# Patient Record
Sex: FEMALE | Race: WHITE | NOT HISPANIC OR LATINO | Employment: UNEMPLOYED | URBAN - METROPOLITAN AREA
[De-identification: names, ages, dates, MRNs, and addresses within clinical notes are randomized per-mention and may not be internally consistent; named-entity substitution may affect disease eponyms.]

---

## 2020-09-18 ENCOUNTER — OFFICE VISIT (OUTPATIENT)
Dept: FAMILY MEDICINE CLINIC | Facility: CLINIC | Age: 30
End: 2020-09-18

## 2020-09-18 VITALS
SYSTOLIC BLOOD PRESSURE: 128 MMHG | DIASTOLIC BLOOD PRESSURE: 78 MMHG | HEIGHT: 63 IN | WEIGHT: 181 LBS | OXYGEN SATURATION: 98 % | BODY MASS INDEX: 32.07 KG/M2 | HEART RATE: 87 BPM

## 2020-09-18 DIAGNOSIS — F41.9 ANXIETY: Primary | ICD-10-CM

## 2020-09-18 DIAGNOSIS — F32.9 MAJOR DEPRESSIVE DISORDER WITH CURRENT ACTIVE EPISODE, UNSPECIFIED DEPRESSION EPISODE SEVERITY, UNSPECIFIED WHETHER RECURRENT: ICD-10-CM

## 2020-09-18 DIAGNOSIS — F51.04 PSYCHOPHYSIOLOGICAL INSOMNIA: ICD-10-CM

## 2020-09-18 PROCEDURE — 99204 OFFICE O/P NEW MOD 45 MIN: CPT | Performed by: INTERNAL MEDICINE

## 2020-09-18 RX ORDER — ESCITALOPRAM OXALATE 20 MG/1
20 TABLET ORAL DAILY
Qty: 30 TABLET | Refills: 5 | Status: SHIPPED | OUTPATIENT
Start: 2020-09-18 | End: 2021-06-21 | Stop reason: SDUPTHER

## 2020-09-18 RX ORDER — CITALOPRAM 40 MG/1
40 TABLET ORAL DAILY
COMMUNITY
Start: 2020-06-17 | End: 2020-09-18

## 2020-09-18 RX ORDER — TRAZODONE HYDROCHLORIDE 50 MG/1
50 TABLET ORAL NIGHTLY
Qty: 30 TABLET | Refills: 3 | Status: SHIPPED | OUTPATIENT
Start: 2020-09-18 | End: 2021-06-21

## 2020-09-18 NOTE — PROGRESS NOTES
Sonia Banks  1990  [id number]  Patient Care Team:  Andres Streeter MD as PCP - General (Internal Medicine)    Sonia Banks is a 30 y.o. female here today to establish care.  This patient is accompanied by their self who contributes to the history of their care.    Chief Complaint:    Chief Complaint   Patient presents with   • Establish Care        History of Present Illness:    celexa 40 since age 20. Anxiety/depression.  She has been out of her medication for a week.  She reports for the past 3 months increase agitation anxiety and depression.  She does have a bladder however owever the baby sleeps through the night.  No homicidal suicidal ideations.  Patient admits to difficulty with sleep onset and early morning awakenings.  She had been weaned off of Celexa before but had a rebound depression that was very severe.  No family history of bipolar however she has a brother that they believe is schizophrenic.  She rarely drinks however smokes approximately a pack per day.    Past Medical History:   Diagnosis Date   • Anxiety    • Depression        History reviewed. No pertinent surgical history.     Family History   Problem Relation Age of Onset   • Anxiety disorder Mother    • Cancer Father         bone   • Other Brother         schizophrenia       Social History     Socioeconomic History   • Marital status: Single     Spouse name: Not on file   • Number of children: Not on file   • Years of education: Not on file   • Highest education level: Not on file   Tobacco Use   • Smoking status: Current Every Day Smoker     Packs/day: 0.25     Years: 10.00     Pack years: 2.50     Types: Cigarettes   • Smokeless tobacco: Never Used   Substance and Sexual Activity   • Alcohol use: Yes   • Drug use: Never   Social History Narrative    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   HENT: Negative for ear pain, postnasal drip, sinus pressure and sore throat.    Eyes: Negative for blurred vision, double vision and visual disturbance.   Respiratory: Negative for cough, shortness of breath and wheezing.    Cardiovascular: Negative for chest pain, palpitations and leg swelling.   Gastrointestinal: Negative for abdominal pain, blood in stool, diarrhea, nausea and vomiting.   Endocrine: Negative for cold intolerance, heat intolerance, polydipsia, polyphagia and polyuria.   Genitourinary: Negative for dysuria, flank pain and hematuria.   Musculoskeletal: Negative for arthralgias and joint swelling.   Skin: Negative for dry skin and rash.   Neurological: Negative for weakness, numbness and headache.   Psychiatric/Behavioral: Positive for sleep disturbance and depressed mood. Negative for self-injury and suicidal ideas. The patient is nervous/anxious.        Vitals:    09/18/20 1607   BP: 128/78   Pulse: 87   SpO2: 98%   Weight: 82.1 kg (181 lb)   Height: 160.4 cm (63.15\")     Body mass index is 31.91 kg/m².      Current Outpatient Medications:   •  escitalopram (Lexapro) 20 MG tablet, Take 1 tablet by mouth Daily., Disp: 30 tablet, Rfl: 5  •  traZODone (DESYREL) 50 MG tablet, Take 1 tablet by mouth Every Night., Disp: 30 tablet, Rfl: 3    Physical Exam:    Physical Exam  Vitals signs and nursing note reviewed.   Constitutional:       General: She is not in acute distress.     Appearance: She is well-developed. She is not diaphoretic.   HENT:      Head: Normocephalic and atraumatic.      Right Ear: Tympanic membrane, ear canal and external ear normal.      Left Ear: Tympanic membrane, ear canal and external ear normal.      Mouth/Throat:      Mouth: Mucous membranes are moist.      Pharynx: No oropharyngeal exudate.   Eyes:      General: No scleral icterus.        Right eye: No discharge.      Conjunctiva/sclera: Conjunctivae normal.   Neck:      Musculoskeletal: Normal range " of motion and neck supple.      Thyroid: No thyromegaly.      Vascular: No JVD.      Trachea: No tracheal deviation.   Cardiovascular:      Rate and Rhythm: Normal rate and regular rhythm.      Pulses: Normal pulses.      Heart sounds: Normal heart sounds.      Comments: PMI nondisplaced  Pulmonary:      Effort: Pulmonary effort is normal.      Breath sounds: Normal breath sounds. No wheezing or rales.   Abdominal:      General: Bowel sounds are normal.      Palpations: Abdomen is soft.      Tenderness: There is no abdominal tenderness. There is no guarding or rebound.   Musculoskeletal:         General: No swelling, tenderness or signs of injury.      Comments: Normal gait     Lymphadenopathy:      Cervical: No cervical adenopathy.   Skin:     General: Skin is warm and dry.      Capillary Refill: Capillary refill takes less than 2 seconds.      Coloration: Skin is not pale.      Findings: No rash.   Neurological:      Mental Status: She is alert and oriented to person, place, and time.      Motor: No abnormal muscle tone.      Coordination: Coordination normal.   Psychiatric:         Mood and Affect: Mood normal.         Behavior: Behavior normal.         Judgment: Judgment normal.         Procedures    Results Review:      Assessment/Plan:    30-year-old female with history of severe depression.  We have resumed her SSRI however change her Lexapro 20 mg daily.  I have also prescribed trazodone 50 mg at bedtime.  She is willing and wants to undergo cognitive behavioral therapy.    Problem List Items Addressed This Visit        Other    Anxiety - Primary    Relevant Orders    Ambulatory Referral to Behavioral Health    Depression    Relevant Medications    escitalopram (Lexapro) 20 MG tablet    traZODone (DESYREL) 50 MG tablet    Psychophysiological insomnia          Plan of care reviewed with patient at the conclusion of today's visit. Education was provided regarding diagnosis and management.  Patient verbalizes  understanding of and agreement with management plan.    Return in about 6 weeks (around 10/30/2020) for Recheck.    Andres Streeter MD    Please note that portions of this note may have been completed with a voice recognition program. Efforts were made to edit the dictations, but occasionally words are mistranscribed.

## 2021-06-14 RX ORDER — ESCITALOPRAM OXALATE 20 MG/1
TABLET ORAL
Qty: 30 TABLET | Refills: 0 | OUTPATIENT
Start: 2021-06-14

## 2021-06-14 NOTE — TELEPHONE ENCOUNTER
LOV:09/18/2020  NOV:Not scheduled  Last fill:09/18/2020    Per PCP's last note patient was suppose to return in about 6 weeks (around 10/30/2020) for anxiety f/u will call to schedule.

## 2021-06-21 ENCOUNTER — TELEMEDICINE (OUTPATIENT)
Dept: FAMILY MEDICINE CLINIC | Facility: CLINIC | Age: 31
End: 2021-06-21

## 2021-06-21 DIAGNOSIS — F33.42 RECURRENT MAJOR DEPRESSIVE DISORDER, IN FULL REMISSION (HCC): ICD-10-CM

## 2021-06-21 DIAGNOSIS — G89.29 CHRONIC MIDLINE THORACIC BACK PAIN: ICD-10-CM

## 2021-06-21 DIAGNOSIS — R05.9 COUGH: Primary | ICD-10-CM

## 2021-06-21 DIAGNOSIS — F41.9 ANXIETY: ICD-10-CM

## 2021-06-21 DIAGNOSIS — M54.6 CHRONIC MIDLINE THORACIC BACK PAIN: ICD-10-CM

## 2021-06-21 PROCEDURE — 99214 OFFICE O/P EST MOD 30 MIN: CPT | Performed by: PHYSICIAN ASSISTANT

## 2021-06-21 RX ORDER — ESCITALOPRAM OXALATE 20 MG/1
20 TABLET ORAL DAILY
Qty: 90 TABLET | Refills: 0 | Status: SHIPPED | OUTPATIENT
Start: 2021-06-21 | End: 2022-08-19 | Stop reason: SDUPTHER

## 2021-06-21 RX ORDER — ESCITALOPRAM OXALATE 20 MG/1
20 TABLET ORAL DAILY
Qty: 30 TABLET | Refills: 5 | Status: SHIPPED | OUTPATIENT
Start: 2021-06-21 | End: 2021-06-21 | Stop reason: SDUPTHER

## 2021-06-21 NOTE — TELEPHONE ENCOUNTER
DELETE AFTER REVIEWING: Send the encounter HIGH priority, If patient has less than a 3 day supply. If the patient will run out of medication over the weekend add that information to the additional details line. Send this encounter to the clinical pool.    Caller: Sonia Banks    Relationship: Self    Best call back number: [phone]    Medication needed:   Requested Prescriptions     Pending Prescriptions Disp Refills   • escitalopram (Lexapro) 20 MG tablet 30 tablet 5     Sig: Take 1 tablet by mouth Daily.       When do you need the refill by: [phone]      What additional details did the patient provide when requesting the medication: PATIENT STATES SHE HAS ONE PILL LEFT; HAS BEEN ON MEDS FOR ABOUT 10 YEARS. STATES IF SHE DOESN'T HAVE HER MEDS, SHE WILL GO INTO A DEPRESSION.  PATIENT ASKING FOR A SMALL REFILL UNTIL SHE CAN COME IN; SAYS HER AND HER DAUGHTER ARE BOTH SICK.  BUT WILL MAKE AN APPT IF NEEDED.         Does the patient have less than a 3 day supply:  [x] Yes  [] No    What is the patient's preferred pharmacy: [id number] - [place], [place] - [address] [facility] [address] - [phone] [facility] [phone] FX       PLEASE CALL PATIENT AND ADVISE.

## 2021-06-21 NOTE — PROGRESS NOTES
Chief Complaint   Patient presents with   • Cough       HPI      Sonia Banks is a 31 y.o. female who presents for Cough, sore throat, myalgias, nasal congestion, post-nasal drainage and loss of taste that started 3 days ago and is not improving.  Her cough is dry, she denies any productive sputum.  No shortness of breath, fever/chills, facial pain, headacheor loss of smell.  Her son has similar symptoms, he has not been seen by pediatrician yet.  Has not been vaccinated for Covid-19.    Patient also inquires about refill on her Lexapro 20 mg daily.  She reports this is helpful for her anxiety.  She denies any issues with depression at this time.  She is not on any other medications and is not followed by psychiatry.    She also reports chronic back pain.    Past Medical History:   Diagnosis Date   • Anxiety    • Depression        History reviewed. No pertinent surgical history.    Family History   Problem Relation Age of Onset   • Anxiety disorder Mother    • Cancer Father         bone   • Other Brother         schizophrenia       Social History     Socioeconomic History   • Marital status: Single     Spouse name: Not on file   • Number of children: Not on file   • Years of education: Not on file   • Highest education level: Not on file   Tobacco Use   • Smoking status: Current Every Day Smoker     Packs/day: 0.25     Years: 10.00     Pack years: 2.50     Types: Cigarettes   • Smokeless tobacco: Never Used   Substance and Sexual Activity   • Alcohol use: Yes   • Drug use: Never       No Known Allergies    ROS    Review of Systems   Constitutional: Positive for fatigue. Negative for chills and fever.   HENT: Positive for congestion, postnasal drip and sore throat. Negative for ear pain, rhinorrhea and sinus pressure.    Respiratory: Positive for cough. Negative for shortness of breath and wheezing.    Musculoskeletal: Positive for myalgias.   Neurological: Negative for dizziness and headache.       There were no  vitals filed for this visit.  There is no height or weight on file to calculate BMI.    Current Outpatient Medications on File Prior to Visit   Medication Sig Dispense Refill   • [DISCONTINUED] escitalopram (Lexapro) 20 MG tablet Take 1 tablet by mouth Daily. 30 tablet 5   • [DISCONTINUED] escitalopram (Lexapro) 20 MG tablet Take 1 tablet by mouth Daily. 30 tablet 5   • [DISCONTINUED] traZODone (DESYREL) 50 MG tablet Take 1 tablet by mouth Every Night. 30 tablet 3     No current facility-administered medications on file prior to visit.       No results found for this or any previous visit.    PE    Physical Exam  Vitals reviewed.   Constitutional:       General: She is not in acute distress.     Appearance: Normal appearance. She is well-developed. She is ill-appearing. She is not diaphoretic.   HENT:      Head: Normocephalic and atraumatic.   Eyes:      Extraocular Movements: Extraocular movements intact.      Conjunctiva/sclera: Conjunctivae normal.   Pulmonary:      Effort: No respiratory distress.      Comments: Dry cough appreciated during video.  Musculoskeletal:         General: Normal range of motion.      Cervical back: Normal range of motion.   Neurological:      General: No focal deficit present.      Mental Status: She is alert.   Psychiatric:         Attention and Perception: She is attentive.         Mood and Affect: Mood normal.         Speech: Speech normal.         Behavior: Behavior normal. Behavior is cooperative.         Thought Content: Thought content normal.         Judgment: Judgment normal.          A/P    Diagnoses and all orders for this visit:    1. Cough (Primary)  -     COVID-19 PCR, [facility], NP SWAB IN [facility] MEDIA 24-30 HR TAT - Swab, Nasopharynx; Future  No productive sputum, facial pain or fever/chills.  Started 3 days ago.  Advised to manage symptomatically.  Not vaccinated for Covid-19.  Will order Covid screening and patient will obtain tomorrow at  office.  Advised patient to quarantine until negative test results return.    2. Recurrent major depressive disorder, in full remission (CMS/HCC)  -     escitalopram (Lexapro) 20 MG tablet; Take 1 tablet by mouth Daily.  Dispense: 90 tablet; Refill: 0  Denies any issues with depression.    3. Anxiety  -     escitalopram (Lexapro) 20 MG tablet; Take 1 tablet by mouth Daily.  Dispense: 90 tablet; Refill: 0  Reports improvement in anxiety with lexapro 20 mg daily.    4. Chronic midline thoracic back pain  Advised patient to follow-up in office for further evaluation.     You have chosen to receive care through a telehealth visit.  Do you consent to use a video/audio connection for your medical care today? Yes      Plan of care reviewed with patient at the conclusion of today's visit. Education was provided regarding diagnosis, management and any prescribed or recommended OTC medications.  Patient verbalizes understanding of and agreement with management plan.    Return in about 3 months (around 9/21/2021) for Annual physical with Dr. Streeter.     Tata Novoa PA-C

## 2021-06-24 ENCOUNTER — HOSPITAL ENCOUNTER (OUTPATIENT)
Dept: GENERAL RADIOLOGY | Facility: HOSPITAL | Age: 31
Discharge: HOME | End: 2021-06-24

## 2021-06-24 ENCOUNTER — OFFICE VISIT (OUTPATIENT)
Dept: FAMILY MEDICINE CLINIC | Facility: CLINIC | Age: 31
End: 2021-06-24

## 2021-06-24 ENCOUNTER — LAB (OUTPATIENT)
Dept: LAB | Facility: HOSPITAL | Age: 31
End: 2021-06-24

## 2021-06-24 VITALS
OXYGEN SATURATION: 98 % | DIASTOLIC BLOOD PRESSURE: 80 MMHG | HEART RATE: 88 BPM | SYSTOLIC BLOOD PRESSURE: 124 MMHG | WEIGHT: 174.8 LBS | HEIGHT: 63 IN | BODY MASS INDEX: 30.97 KG/M2

## 2021-06-24 DIAGNOSIS — G89.29 CHRONIC MIDLINE THORACIC BACK PAIN: ICD-10-CM

## 2021-06-24 DIAGNOSIS — G89.29 CHRONIC MIDLINE THORACIC BACK PAIN: Primary | ICD-10-CM

## 2021-06-24 DIAGNOSIS — R05.9 COUGH: ICD-10-CM

## 2021-06-24 DIAGNOSIS — M54.6 CHRONIC MIDLINE THORACIC BACK PAIN: ICD-10-CM

## 2021-06-24 DIAGNOSIS — M54.6 CHRONIC MIDLINE THORACIC BACK PAIN: Primary | ICD-10-CM

## 2021-06-24 DIAGNOSIS — R10.11 RIGHT UPPER QUADRANT ABDOMINAL PAIN: ICD-10-CM

## 2021-06-24 LAB
ALBUMIN SERPL-MCNC: 4.7 G/DL (ref 3.5–5.2)
ALBUMIN/GLOB SERPL: 2 G/DL
ALP SERPL-CCNC: 82 U/L (ref 39–117)
ALT SERPL W P-5'-P-CCNC: 10 U/L (ref 1–33)
ANION GAP SERPL CALCULATED.3IONS-SCNC: 10 MMOL/L (ref 5–15)
AST SERPL-CCNC: 16 U/L (ref 1–32)
BASOPHILS # BLD AUTO: 0.04 10*3/MM3 (ref 0–0.2)
BASOPHILS NFR BLD AUTO: 0.5 % (ref 0–1.5)
BILIRUB SERPL-MCNC: 0.3 MG/DL (ref 0–1.2)
BUN SERPL-MCNC: 11 MG/DL (ref 6–20)
BUN/CREAT SERPL: 12.8 (ref 7–25)
CALCIUM SPEC-SCNC: 9.3 MG/DL (ref 8.6–10.5)
CHLORIDE SERPL-SCNC: 99 MMOL/L (ref 98–107)
CO2 SERPL-SCNC: 24 MMOL/L (ref 22–29)
CREAT SERPL-MCNC: 0.86 MG/DL (ref 0.57–1)
DEPRECATED RDW RBC AUTO: 44.1 FL (ref 37–54)
EOSINOPHIL # BLD AUTO: 0.11 10*3/MM3 (ref 0–0.4)
EOSINOPHIL NFR BLD AUTO: 1.3 % (ref 0.3–6.2)
ERYTHROCYTE [DISTWIDTH] IN BLOOD BY AUTOMATED COUNT: 13.8 % (ref 12.3–15.4)
GFR SERPL CREATININE-BSD FRML MDRD: 77 ML/MIN/1.73
GLOBULIN UR ELPH-MCNC: 2.4 GM/DL
GLUCOSE SERPL-MCNC: 91 MG/DL (ref 65–99)
HCT VFR BLD AUTO: 45.2 % (ref 34–46.6)
HGB BLD-MCNC: 15.1 G/DL (ref 12–15.9)
IMM GRANULOCYTES # BLD AUTO: 0.04 10*3/MM3 (ref 0–0.05)
IMM GRANULOCYTES NFR BLD AUTO: 0.5 % (ref 0–0.5)
LYMPHOCYTES # BLD AUTO: 2.33 10*3/MM3 (ref 0.7–3.1)
LYMPHOCYTES NFR BLD AUTO: 26.8 % (ref 19.6–45.3)
MCH RBC QN AUTO: 29.1 PG (ref 26.6–33)
MCHC RBC AUTO-ENTMCNC: 33.4 G/DL (ref 31.5–35.7)
MCV RBC AUTO: 87.1 FL (ref 79–97)
MONOCYTES # BLD AUTO: 0.51 10*3/MM3 (ref 0.1–0.9)
MONOCYTES NFR BLD AUTO: 5.9 % (ref 5–12)
NEUTROPHILS NFR BLD AUTO: 5.66 10*3/MM3 (ref 1.7–7)
NEUTROPHILS NFR BLD AUTO: 65 % (ref 42.7–76)
NRBC BLD AUTO-RTO: 0 /100 WBC (ref 0–0.2)
PLATELET # BLD AUTO: 336 10*3/MM3 (ref 140–450)
PMV BLD AUTO: 10.6 FL (ref 6–12)
POTASSIUM SERPL-SCNC: 4.2 MMOL/L (ref 3.5–5.2)
PROT SERPL-MCNC: 7.1 G/DL (ref 6–8.5)
RBC # BLD AUTO: 5.19 10*6/MM3 (ref 3.77–5.28)
SODIUM SERPL-SCNC: 133 MMOL/L (ref 136–145)
WBC # BLD AUTO: 8.69 10*3/MM3 (ref 3.4–10.8)

## 2021-06-24 PROCEDURE — 80053 COMPREHEN METABOLIC PANEL: CPT

## 2021-06-24 PROCEDURE — 99214 OFFICE O/P EST MOD 30 MIN: CPT | Performed by: INTERNAL MEDICINE

## 2021-06-24 PROCEDURE — 85025 COMPLETE CBC W/AUTO DIFF WBC: CPT

## 2021-06-24 PROCEDURE — 72072 X-RAY EXAM THORAC SPINE 3VWS: CPT

## 2021-06-24 PROCEDURE — 71046 X-RAY EXAM CHEST 2 VIEWS: CPT

## 2021-06-24 RX ORDER — AZITHROMYCIN 250 MG/1
TABLET, FILM COATED ORAL
Qty: 6 TABLET | Refills: 0 | Status: SHIPPED | OUTPATIENT
Start: 2021-06-24

## 2021-06-24 RX ORDER — ALBUTEROL SULFATE 90 UG/1
2 AEROSOL, METERED RESPIRATORY (INHALATION) EVERY 4 HOURS PRN
Qty: 17 G | Refills: 2 | Status: SHIPPED | OUTPATIENT
Start: 2021-06-24

## 2021-06-24 NOTE — PROGRESS NOTES
"Sonia Banks  1990  [id number]  Patient Care Team:  Andres Streeter MD as PCP - General (Internal Medicine)    Sonia Banks is a 31 y.o. female here today for follow up.     This patient is accompanied by their self who contributes to the history of their care.    Chief Complaint:    Chief Complaint   Patient presents with   • Back Pain      Mid lumbar pain. Started a year ago, unknown injury. pains comes and goes through out the day        History of Present Illness:  I have reviewed and/or updated the patient's past medical, past surgical, family, social history, problem list and allergies as appropriate.     One year midback pain with occasional radaiting to abdomen.. Tylenol and advil help. Takes multiple times per day. No leg pain numbness or tingling. No precipitating, factors. Bending twisting. Rolling on a toy ball helped. Denies nausea or vomiting no abdominal pain.  No urinary frequency urgency or hematuria.  Chronic cough with green grey sputum. No hemoptysis.  She is most concerned about gallbladder given her sister's recent history.  Denies any bowel or bladder changes.  No tingling or weakness.  Occasional pain with coughing.  Denies wheezing.  Mother recently diagnosed with lung cancer.    Review of Systems:    Review of Systems   Constitutional: Negative for fever and unexpected weight loss.   HENT: Negative.    Respiratory: Positive for cough.    Cardiovascular: Positive for chest pain.   Gastrointestinal: Positive for abdominal pain.   Endocrine: Negative for cold intolerance and heat intolerance.   Genitourinary: Negative for dysuria, pelvic pain and urinary incontinence.   Musculoskeletal: Positive for back pain.   Neurological: Positive for numbness. Negative for weakness.       Vitals:    06/24/21 1124   BP: 124/80   BP Location: Left arm   Patient Position: Sitting   Cuff Size: Adult   Pulse: 88   SpO2: 98%   Weight: 79.3 kg (174 lb 12.8 oz)   Height: 160.4 cm (63.15\")   PainSc:   8 "   PainLoc: Back     Body mass index is 30.82 kg/m².      Current Outpatient Medications:   •  escitalopram (Lexapro) 20 MG tablet, Take 1 tablet by mouth Daily., Disp: 90 tablet, Rfl: 0  •  albuterol sulfate  (90 Base) MCG/ACT inhaler, Inhale 2 puffs Every 4 (Four) Hours As Needed for Wheezing., Disp: 17 g, Rfl: 2  •  azithromycin (Zithromax Z-Varinder) 250 MG tablet, Take 2 tablets by mouth on day 1, then 1 tablet daily on days 2-5, Disp: 6 tablet, Rfl: 0    Physical Exam:    Physical Exam  Vitals and nursing note reviewed.   Constitutional:       General: She is not in acute distress.     Appearance: She is well-developed. She is not diaphoretic.   HENT:      Head: Normocephalic and atraumatic.      Right Ear: External ear normal.      Left Ear: External ear normal.      Mouth/Throat:      Pharynx: No oropharyngeal exudate.   Eyes:      General: No scleral icterus.        Right eye: No discharge.      Conjunctiva/sclera: Conjunctivae normal.   Neck:      Thyroid: No thyromegaly.      Vascular: No JVD.      Trachea: No tracheal deviation.   Cardiovascular:      Rate and Rhythm: Normal rate and regular rhythm.      Heart sounds: Normal heart sounds.      Comments: PMI nondisplaced  Pulmonary:      Effort: Pulmonary effort is normal.      Breath sounds: Normal breath sounds. No wheezing or rales.   Abdominal:      General: Bowel sounds are normal.      Palpations: Abdomen is soft.      Tenderness: There is no abdominal tenderness. There is no guarding or rebound.   Musculoskeletal:         General: Tenderness present. Normal range of motion.      Cervical back: Normal range of motion and neck supple.      Comments: Normal gait.  Straight leg raise negative.  She has midline thoracic tenderness just below her scapula.  There is left and right paraspinous guarding.   Lymphadenopathy:      Cervical: No cervical adenopathy.   Skin:     General: Skin is warm and dry.      Capillary Refill: Capillary refill takes less  than 2 seconds.      Coloration: Skin is not pale.      Findings: No rash.   Neurological:      Mental Status: She is alert and oriented to person, place, and time.      Motor: No abnormal muscle tone.      Coordination: Coordination normal.   Psychiatric:         Judgment: Judgment normal.         Procedures    Results Review:    None    Assessment/Plan:     Problem List Items Addressed This Visit        Gastrointestinal Abdominal     Right upper quadrant abdominal pain    Current Assessment & Plan     Suspect this pain is more musculoskeletal in nature, spinal.  However persists consider right upper quadrant ultrasound.  CMP and CBC requested.            Musculoskeletal and Injuries    Chronic midline thoracic back pain - Primary    Relevant Orders    XR Chest PA & Lateral    XR Spine Thoracic 3 View (Completed)    Ambulatory Referral to Physical Therapy Evaluate and treat    CBC & Differential (Completed)    Comprehensive Metabolic Panel (Completed)       Pulmonary and Pneumonias    Cough    Current Assessment & Plan     Suspect an element of asthmatic bronchitis.  Z-Varinder requested         Relevant Orders    XR Chest PA & Lateral    XR Spine Thoracic 3 View (Completed)    Ambulatory Referral to Physical Therapy Evaluate and treat          Plan of care reviewed with patient at the conclusion of today's visit. Education was provided regarding diagnosis and management.  Patient verbalizes understanding of and agreement with management plan.    Return in about 4 weeks (around 7/22/2021), or back dago.    Andres Streeter MD    Please note that portions of this note may have been completed with a voice recognition program. Efforts were made to edit the dictations, but occasionally words are mistranscribed.         Answers for HPI/ROS submitted by the patient on 6/24/2021  What is the primary reason for your visit?: Back Pain  Chronicity: chronic  Onset: more than 1 year ago  Frequency: constantly  Progression since  onset: gradually worsening  Pain location: thoracic spine  Pain quality: aching  Radiates to: does not radiate  Pain - numeric: 9/10  Pain is: the same all the time  Aggravated by: position  Stiffness is present: all day  bowel incontinence: Yes  headaches: No  leg pain: No  paresis: No  paresthesias: No  perianal numbness: No  tingling: No  Risk factors: history of cancer, lack of exercise, obesity, poor posture

## 2021-06-24 NOTE — PATIENT INSTRUCTIONS
Thoracic Strain Rehab  Ask your health care provider which exercises are safe for you. Do exercises exactly as told by your health care provider and adjust them as directed. It is normal to feel mild stretching, pulling, tightness, or discomfort as you do these exercises. Stop right away if you feel sudden pain or your pain gets worse. Do not begin these exercises until told by your health care provider.  Stretching and range-of-motion exercise  This exercise warms up your muscles and joints and improves the movement and flexibility of your back and shoulders. This exercise also helps to relieve pain.  Chest and spine stretch    1. Lie down on your back on a firm surface.  2. Roll a towel or a small blanket so it is about 4 inches (10 cm) in diameter.  3. Put the towel lengthwise under the middle of your back so it is under your spine, but not under your shoulder blades.  4. Put your hands behind your head and let your elbows fall to your sides. This will increase your stretch.  5. Take a deep breath (inhale).  6. Hold for __________ seconds.  7. Relax after you breathe out (exhale).  Repeat __________ times. Complete this exercise __________ times a day.  Strengthening exercises  These exercises build strength and endurance in your back and your shoulder blade muscles. Endurance is the ability to use your muscles for a long time, even after they get tired.  Alternating arm and leg raises    1. Get on your hands and knees on a firm surface. If you are on a hard floor, you may want to use padding, such as an exercise mat, to cushion your knees.  2. Line up your arms and legs. Your hands should be directly below your shoulders, and your knees should be directly below your hips.  3. Lift your left leg behind you. At the same time, raise your right arm and straighten it in front of you.  ? Do not lift your leg higher than your hip.  ? Do not lift your arm higher than your shoulder.  ? Keep your abdominal and back  muscles tight.  ? Keep your hips facing the ground.  ? Do not arch your back.  ? Keep your balance carefully, and do not hold your breath.  4. Hold for __________ seconds.  5. Slowly return to the starting position and repeat with your right leg and your left arm.  Repeat __________ times. Complete this exercise __________ times a day.  Straight arm rows  This exercise is also called shoulder extension exercise.  1. Stand with your feet shoulder width apart.  2. Secure an exercise band to a stable object in front of you so the band is at or above shoulder height.  3. Hold one end of the exercise band in each hand.  4. Straighten your elbows and lift your hands up to shoulder height.  5. Step back, away from the secured end of the exercise band, until the band stretches.  6. Squeeze your shoulder blades together and pull your hands down to the sides of your thighs. Stop when your hands are straight down by your sides. This is shoulder extension. Do not let your hands go behind your body.  7. Hold for __________ seconds.  8. Slowly return to the starting position.  Repeat __________ times. Complete this exercise __________ times a day.  Prone shoulder external rotation  1. Lie on your abdomen on a firm bed so your left / right forearm hangs over the edge of the bed and your upper arm is on the bed, straight out from your body. This is the prone position.  ? Your elbow should be bent.  ? Your palm should be facing your feet.  2. If instructed, hold a __________ weight in your hand.  3. Squeeze your shoulder blade toward the middle of your back. Do not let your shoulder lift toward your ear.  4. Keep your elbow bent in a 90-degree angle (right angle) while you slowly move your forearm up toward the ceiling. Move your forearm up to the height of the bed, toward your head. This is external rotation.  ? Your upper arm should not move.  ? At the top of the movement, your palm should face the floor.  5. Hold for __________  seconds.  6. Slowly return to the starting position and relax your muscles.  Repeat __________ times. Complete this exercise __________ times a day.  Rowing scapular retraction  This is an exercise in which the shoulder blades (scapulae) are pulled toward each other (retraction).  1. Sit in a stable chair without armrests, or stand up.  2. Secure an exercise band to a stable object in front of you so the band is at shoulder height.  3. Hold one end of the exercise band in each hand. Your palms should face down.  4. Bring your arms out straight in front of you.  5. Step back, away from the secured end of the exercise band, until the band stretches.  6. Pull the band backward. As you do this, bend your elbows and squeeze your shoulder blades together, but avoid letting the rest of your body move. Do not shrug your shoulders upward while you do this.  7. Stop when your elbows are at your sides or slightly behind your body.  8. Hold for __________ seconds.  9. Slowly straighten your arms to return to the starting position.  Repeat __________ times. Complete this exercise __________ times a day.  Posture and body mechanics  Good posture and healthy body mechanics can help to relieve stress in your body's tissues and joints. Body mechanics refers to the movements and positions of your body while you do your daily activities. Posture is part of body mechanics. Good posture means:  · Your spine is in its natural S-curve position (neutral).  · Your shoulders are pulled back slightly.  · Your head is not tipped forward.  Follow these guidelines to improve your posture and body mechanics in your everyday activities.  Standing    · When standing, keep your spine neutral and your feet about hip width apart. Keep a slight bend in your knees. Your ears, shoulders, and hips should line up with each other.  · When you do a task in which you lean forward while standing in one place for a long time, place one foot up on a stable  object that is 2-4 inches (5-10 cm) high, such as a footstool. This helps keep your spine neutral.  Sitting    · When sitting, keep your spine neutral and keep your feet flat on the floor. Use a footrest, if necessary, and keep your thighs parallel to the floor. Avoid rounding your shoulders, and avoid tilting your head forward.  · When working at a desk or a computer, keep your desk at a height where your hands are slightly lower than your elbows. Slide your chair under your desk so you are close enough to maintain good posture.  · When working at a computer, place your monitor at a height where you are looking straight ahead and you do not have to tilt your head forward or downward to look at the screen.  Resting  When lying down and resting, avoid positions that are most painful for you.  · If you have pain with activities such as sitting, bending, stooping, or squatting (flexion-basedactivities), lie in a position in which your body does not bend very much. For example, avoid curling up on your side with your arms and knees near your chest (fetal position).  · If you have pain with activities such as standing for a long time or reaching with your arms (extension-basedactivities), lie with your spine in a neutral position and bend your knees slightly. Try the following positions:  ? Lie on your side with a pillow between your knees.  ? Lie on your back with a pillow under your knees.    Lifting    · When lifting objects, keep your feet at least shoulder width apart and tighten your abdominal muscles.  · Bend your knees and hips and keep your spine neutral. It is important to lift using the strength of your legs, not your back. Do not lock your knees straight out.  · Always ask for help to lift heavy or awkward objects.  This information is not intended to replace advice given to you by your health care provider. Make sure you discuss any questions you have with your health care provider.  Document Revised:  04/10/2020 Document Reviewed: 01/27/2020  Elsevier Patient Education © 2021 [employer]

## 2021-06-25 PROBLEM — R05.9 COUGH: Status: ACTIVE | Noted: 2021-06-25

## 2021-06-25 PROBLEM — G89.29 CHRONIC MIDLINE THORACIC BACK PAIN: Status: ACTIVE | Noted: 2021-06-25

## 2021-06-25 PROBLEM — R10.11 RIGHT UPPER QUADRANT ABDOMINAL PAIN: Status: ACTIVE | Noted: 2021-06-25

## 2021-06-25 PROBLEM — M54.6 CHRONIC MIDLINE THORACIC BACK PAIN: Status: ACTIVE | Noted: 2021-06-25

## 2021-06-25 NOTE — ASSESSMENT & PLAN NOTE
Suspect this pain is more musculoskeletal in nature, spinal.  However persists consider right upper quadrant ultrasound.  CMP and CBC requested.

## 2022-07-05 DIAGNOSIS — F33.42 RECURRENT MAJOR DEPRESSIVE DISORDER, IN FULL REMISSION: ICD-10-CM

## 2022-07-05 DIAGNOSIS — F41.9 ANXIETY: ICD-10-CM

## 2022-07-05 RX ORDER — ESCITALOPRAM OXALATE 20 MG/1
20 TABLET ORAL DAILY
Qty: 30 TABLET | Refills: 0 | OUTPATIENT
Start: 2022-07-05

## 2022-07-05 RX ORDER — ESCITALOPRAM OXALATE 20 MG/1
20 TABLET ORAL DAILY
Qty: 30 TABLET | Refills: 0 | Status: SHIPPED | OUTPATIENT
Start: 2022-07-05 | End: 2022-08-19 | Stop reason: SDUPTHER

## 2022-07-05 NOTE — TELEPHONE ENCOUNTER
Rx Refill Note  Requested Prescriptions     Pending Prescriptions Disp Refills   • escitalopram (LEXAPRO) 20 MG tablet [Pharmacy Med Name: Escitalopram Oxalate Oral Tablet 20 MG] 90 tablet 0     Sig: TAKE 1 TABLET BY MOUTH EVERY DAY      Last office visit with prescribing clinician: 06/01/2022     Next office visit with prescribing clinician: 7/8/2022            Chuy Vela MA  07/05/22, 09:57 EDT     Pt is currently in [place] for the summer with Family. Pt has appointment made and is requesting medication be sent where she currently is.

## 2022-08-19 DIAGNOSIS — F33.42 RECURRENT MAJOR DEPRESSIVE DISORDER, IN FULL REMISSION: ICD-10-CM

## 2022-08-19 DIAGNOSIS — F41.9 ANXIETY: ICD-10-CM

## 2022-08-19 RX ORDER — ESCITALOPRAM OXALATE 20 MG/1
20 TABLET ORAL DAILY
Qty: 7 TABLET | Refills: 0 | Status: SHIPPED | OUTPATIENT
Start: 2022-08-19 | End: 2022-10-12 | Stop reason: SDUPTHER

## 2022-08-19 NOTE — TELEPHONE ENCOUNTER
Caller: Sonia Banks    Relationship: Self    Best call back number: [phone]    Requested Prescriptions:   Requested Prescriptions     Pending Prescriptions Disp Refills   • escitalopram (Lexapro) 20 MG tablet 90 tablet 0     Sig: Take 1 tablet by mouth Daily.        Pharmacy where request should be sent: [facility] [place], [place] - [address] [facility] [address] - [phone]  - [phone]     Additional details provided by patient: PATIENT HAS AN APPOINTMENT SET FOR Thursday 08/25/22. SHE STATED THAT SHE IS COMPLETELY OUT OF THIS MEDICATION AND NEEDS ENOUGH TO LAST HER UNTIL HER APPOINTMENT.    Does the patient have less than a 3 day supply:  [x] Yes  [] No    Moreno Delgado Rep   08/19/22 14:06 EDT

## 2022-08-19 NOTE — TELEPHONE ENCOUNTER
Rx Refill Note  Requested Prescriptions     Pending Prescriptions Disp Refills   • escitalopram (Lexapro) 20 MG tablet 90 tablet 0     Sig: Take 1 tablet by mouth Daily.      Last office visit with prescribing clinician: 6/24/2021      Next office visit with prescribing clinician: 8/25/2022            Gage Murdock MA  08/19/22, 15:43 EDT

## 2022-10-12 ENCOUNTER — TELEMEDICINE (OUTPATIENT)
Dept: FAMILY MEDICINE CLINIC | Facility: CLINIC | Age: 32
End: 2022-10-12

## 2022-10-12 DIAGNOSIS — F41.9 ANXIETY: ICD-10-CM

## 2022-10-12 DIAGNOSIS — F33.42 RECURRENT MAJOR DEPRESSIVE DISORDER, IN FULL REMISSION: Primary | ICD-10-CM

## 2022-10-12 PROCEDURE — 99213 OFFICE O/P EST LOW 20 MIN: CPT | Performed by: INTERNAL MEDICINE

## 2022-10-12 RX ORDER — ESCITALOPRAM OXALATE 20 MG/1
20 TABLET ORAL DAILY
Qty: 90 TABLET | Refills: 2 | Status: SHIPPED | OUTPATIENT
Start: 2022-10-12

## 2022-10-12 NOTE — PROGRESS NOTES
Sonia Banks  1990  [id number]  Patient Care Team:  Andres Streeter MD as PCP - General (Internal Medicine)    Sonia Banks is a 32 y.o. female here today for follow up.     This patient is accompanied by their self who contributes to the history of their care.    Chief Complaint:    No chief complaint on file.  Escitalopram follow-up.  Annual visit was requested by patient.  She is located in [facility].  I myself located in [facility].    History of Present Illness:  I have reviewed and/or updated the patient's past medical, past surgical, family, social history, problem list and allergies as appropriate.     Is a 32-year-old female is been on escitalopram for greater than 10 years.  She has tried to come off of it has been off as long as 6 months however had worsening depression.  She is tolerating this without side effects and would like to continue.  She denies any headaches nausea or vomiting.  No HI or SI.  Sleeping well.  She has been on 20 mg daily.    Review of Systems   Constitutional: Negative.    Eyes: Negative.    Respiratory: Negative.    Psychiatric/Behavioral: Negative.  Negative for sleep disturbance and suicidal ideas.       There were no vitals filed for this visit.  There is no height or weight on file to calculate BMI.    Physical Exam  Vitals and nursing note reviewed.   Constitutional:       General: She is not in acute distress.     Appearance: Normal appearance. She is well-developed. She is not diaphoretic.   HENT:      Head: Normocephalic and atraumatic.      Right Ear: External ear normal.      Left Ear: External ear normal.      Mouth/Throat:      Pharynx: No oropharyngeal exudate.   Eyes:      General: No scleral icterus.        Right eye: No discharge.      Conjunctiva/sclera: Conjunctivae normal.   Neck:      Thyroid: No thyromegaly.      Vascular: No JVD.      Trachea: No tracheal deviation.   Cardiovascular:      Rate and Rhythm: Normal rate and regular  rhythm.      Comments: PMI nondisplaced  Pulmonary:      Effort: Pulmonary effort is normal. No respiratory distress.   Musculoskeletal:      Cervical back: Normal range of motion and neck supple.      Comments: Normal gait   Lymphadenopathy:      Cervical: No cervical adenopathy.   Skin:     General: Skin is warm.   Neurological:      Mental Status: She is alert.      Motor: No abnormal muscle tone.   Psychiatric:         Mood and Affect: Mood normal.         Behavior: Behavior normal.         Thought Content: Thought content normal.         Procedures    Results Review:    None    Assessment/Plan:     Problem List Items Addressed This Visit        Mental Health    Anxiety    Relevant Medications    escitalopram (Lexapro) 20 MG tablet    Depression - Primary    Current Assessment & Plan     Patient's depression is recurrent and is mild without psychosis. Their depression is currently in full remission and the condition is improving with treatment. This will be reassessed at the next regular appointment. F/U as described:patient will continue current medication therapy.         Relevant Medications    escitalopram (Lexapro) 20 MG tablet       Plan of care reviewed with patient at the conclusion of today's visit. Education was provided regarding diagnosis and management.  Patient verbalizes understanding of and agreement with management plan.    Return in about 9 months (around 7/12/2023) for lexaapro.    Andres Streeter MD      Please note than portions of this note were completed wt a Voice Recognition Program

## 2023-11-02 DIAGNOSIS — F41.9 ANXIETY: ICD-10-CM

## 2023-11-02 DIAGNOSIS — F33.42 RECURRENT MAJOR DEPRESSIVE DISORDER, IN FULL REMISSION: ICD-10-CM

## 2023-11-06 RX ORDER — ESCITALOPRAM OXALATE 20 MG/1
20 TABLET ORAL DAILY
Qty: 90 TABLET | Refills: 0 | Status: SHIPPED | OUTPATIENT
Start: 2023-11-06

## 2023-11-06 NOTE — TELEPHONE ENCOUNTER
Rx Refill Note  Requested Prescriptions     Pending Prescriptions Disp Refills    escitalopram (LEXAPRO) 20 MG tablet [Pharmacy Med Name: Escitalopram Oxalate Oral Tablet 20 MG] 90 tablet 0     Sig: TAKE 1 TABLET BY MOUTH EVERY DAY      Last office visit with prescribing clinician: Visit date not found   Last telemedicine visit with prescribing clinician: Visit date not found   Next office visit with prescribing clinician: Visit date not found                         Would you like a call back once the refill request has been completed: [] Yes [] No    If the office needs to give you a call back, can they leave a voicemail: [] Yes [] No    Cathi Gonzales MA  11/06/23, 14:28 EST    NEEDS an APPOINMENT

## 2024-04-18 DIAGNOSIS — F41.9 ANXIETY: ICD-10-CM

## 2024-04-18 DIAGNOSIS — F33.42 RECURRENT MAJOR DEPRESSIVE DISORDER, IN FULL REMISSION: ICD-10-CM

## 2024-04-18 RX ORDER — ESCITALOPRAM OXALATE 20 MG/1
20 TABLET ORAL DAILY
Qty: 90 TABLET | Refills: 0 | Status: SHIPPED | OUTPATIENT
Start: 2024-04-18

## 2024-04-18 NOTE — TELEPHONE ENCOUNTER
Rx Refill Note  Requested Prescriptions     Pending Prescriptions Disp Refills    escitalopram (LEXAPRO) 20 MG tablet [Pharmacy Med Name: Escitalopram Oxalate Oral Tablet 20 MG] 90 tablet 0     Sig: TAKE 1 TABLET BY MOUTH EVERY DAY      Last office visit with prescribing clinician: Visit date not found   Last telemedicine visit with prescribing clinician: Visit date not found   Next office visit with prescribing clinician: Visit date not found                         Would you like a call back once the refill request has been completed: [] Yes [] No    If the office needs to give you a call back, can they leave a voicemail: [] Yes [] No    Pavel Daniels MA  04/18/24, 15:46 EDT

## 2024-08-02 DIAGNOSIS — F33.42 RECURRENT MAJOR DEPRESSIVE DISORDER, IN FULL REMISSION: ICD-10-CM

## 2024-08-02 DIAGNOSIS — F41.9 ANXIETY: ICD-10-CM

## 2024-08-05 ENCOUNTER — OFFICE VISIT (OUTPATIENT)
Dept: FAMILY MEDICINE CLINIC | Facility: CLINIC | Age: 34
End: 2024-08-05
Payer: MEDICAID

## 2024-08-05 ENCOUNTER — LAB (OUTPATIENT)
Dept: LAB | Facility: HOSPITAL | Age: 34
End: 2024-08-05
Payer: MEDICAID

## 2024-08-05 VITALS
OXYGEN SATURATION: 100 % | BODY MASS INDEX: 28.24 KG/M2 | HEART RATE: 58 BPM | DIASTOLIC BLOOD PRESSURE: 78 MMHG | SYSTOLIC BLOOD PRESSURE: 122 MMHG | WEIGHT: 159.4 LBS | HEIGHT: 63 IN

## 2024-08-05 DIAGNOSIS — Z00.00 ANNUAL PHYSICAL EXAM: ICD-10-CM

## 2024-08-05 DIAGNOSIS — F33.42 RECURRENT MAJOR DEPRESSIVE DISORDER, IN FULL REMISSION: ICD-10-CM

## 2024-08-05 DIAGNOSIS — K80.10 CALCULUS OF GALLBLADDER WITH CHRONIC CHOLECYSTITIS WITHOUT OBSTRUCTION: ICD-10-CM

## 2024-08-05 DIAGNOSIS — F17.219 CIGARETTE NICOTINE DEPENDENCE WITH NICOTINE-INDUCED DISORDER: ICD-10-CM

## 2024-08-05 DIAGNOSIS — F41.9 ANXIETY: ICD-10-CM

## 2024-08-05 DIAGNOSIS — Z00.00 ANNUAL PHYSICAL EXAM: Primary | ICD-10-CM

## 2024-08-05 DIAGNOSIS — E66.3 OVERWEIGHT (BMI 25.0-29.9): ICD-10-CM

## 2024-08-05 DIAGNOSIS — R10.11 RIGHT UPPER QUADRANT ABDOMINAL PAIN: ICD-10-CM

## 2024-08-05 DIAGNOSIS — K80.50 BILIARY COLIC: ICD-10-CM

## 2024-08-05 DIAGNOSIS — Z00.00 ENCOUNTER FOR ROUTINE HISTORY AND PHYSICAL EXAM IN FEMALE: ICD-10-CM

## 2024-08-05 PROCEDURE — 86803 HEPATITIS C AB TEST: CPT

## 2024-08-05 PROCEDURE — 80061 LIPID PANEL: CPT

## 2024-08-05 PROCEDURE — 2014F MENTAL STATUS ASSESS: CPT | Performed by: INTERNAL MEDICINE

## 2024-08-05 PROCEDURE — 99395 PREV VISIT EST AGE 18-39: CPT | Performed by: INTERNAL MEDICINE

## 2024-08-05 PROCEDURE — 1125F AMNT PAIN NOTED PAIN PRSNT: CPT | Performed by: INTERNAL MEDICINE

## 2024-08-05 PROCEDURE — 80050 GENERAL HEALTH PANEL: CPT

## 2024-08-05 RX ORDER — ESCITALOPRAM OXALATE 20 MG/1
20 TABLET ORAL DAILY
Qty: 30 TABLET | Refills: 0 | Status: SHIPPED | OUTPATIENT
Start: 2024-08-05 | End: 2024-08-05 | Stop reason: SDUPTHER

## 2024-08-05 RX ORDER — BACLOFEN 10 MG/1
10 TABLET ORAL 3 TIMES DAILY
Qty: 60 TABLET | Refills: 1 | Status: SHIPPED | OUTPATIENT
Start: 2024-08-05

## 2024-08-05 RX ORDER — VARENICLINE TARTRATE 1 MG/1
1 TABLET, FILM COATED ORAL 2 TIMES DAILY
Qty: 56 TABLET | Refills: 1 | Status: SHIPPED | OUTPATIENT
Start: 2024-09-02 | End: 2024-10-28

## 2024-08-05 RX ORDER — ESCITALOPRAM OXALATE 20 MG/1
20 TABLET ORAL DAILY
Qty: 90 TABLET | Refills: 2 | Status: SHIPPED | OUTPATIENT
Start: 2024-08-05

## 2024-08-05 RX ORDER — MELOXICAM 15 MG/1
15 TABLET ORAL DAILY
Qty: 30 TABLET | Refills: 5 | Status: SHIPPED | OUTPATIENT
Start: 2024-08-05

## 2024-08-05 RX ORDER — VARENICLINE TARTRATE 0.5 (11)-1
KIT ORAL
Qty: 1 EACH | Refills: 0 | Status: SHIPPED | OUTPATIENT
Start: 2024-08-05 | End: 2024-09-02

## 2024-08-05 NOTE — ASSESSMENT & PLAN NOTE
Patient's (Body mass index is 28.24 kg/m².) indicates that they are overweight with health conditions that include none . Weight is improving with lifestyle modifications. BMI is above average; BMI management plan is completed. We discussed low calorie, low carb based diet program, portion control, and increasing exercise.  Recommend nutritional counseling and Mediterranean diet. Per  min aerobic physical activity weekly

## 2024-08-05 NOTE — PROGRESS NOTES
"Sonia Banks  1990  [id number]  Patient Care Team:  Andres Srteeter MD as PCP - General (Internal Medicine)    Sonia Banks is a 34 y.o. female here today for annual exam.  This patient is accompanied by their self who contributes to the history of their care.    Chief Complaint:    Chief Complaint   Patient presents with    Annual Exam    Abdominal Pain     Gall bladder    Mass     Lower lumbar       History of Present Illness:     She has chronic posterior right sided back pain worse with eating. It is oftern in ruq/ epigastric regions. Was seen in er in 2022 dx with cholelithiasis and thickened GB. She reports constant pain, lies on ice, takes tylenol and advil with minimal relief. Has not seen surgery . Eating fatty food worsens significant. Would like to see surgery regarding GB.  Denies fevers/chills.  Advil, salon pas, tylenol no  help  She does not exercise, not watching her diet.  She is non fasted for blood work.    She has noted a lumbar \"mass\" occasional tender. It is not painful until it gets tight. Denies any LE weakness, numbness or tingling.    She has started vaping while trying to quit smoking.  Has not tried chantix- would like to try.   Past Medical History:   Diagnosis Date    Anxiety     Depression        No past surgical history on file.     Family History   Problem Relation Age of Onset    Anxiety disorder Mother     Cancer Father         bone    Other Brother         schizophrenia       Social History     Socioeconomic History    Marital status: Single   Tobacco Use    Smoking status: Every Day     Current packs/day: 0.25     Average packs/day: 0.3 packs/day for 10.0 years (2.5 ttl pk-yrs)     Types: Cigarettes    Smokeless tobacco: Never   Vaping Use    Vaping status: Never Used   Substance and Sexual Activity    Alcohol use: Yes    Drug use: Never       No Known Allergies    Depression: PHQ-2 Depression Screening  Little interest or pleasure in doing things? 0-->not at all " "  Feeling down, depressed, or hopeless? 0-->not at all   PHQ-2 Total Score 0      Immunization History   Administered Date(s) Administered    COVID-19 (PFIZER) Purple Cap Monovalent 08/21/2021    Flu Vaccine Quad PF >36MO 10/12/2017    Fluzone (or Fluarix & Flulaval for VFC) >6mos 10/12/2017    Tdap 08/04/2017       Intimate partner violence ( Screen on initial visit, pregnant women, women with injuries, older adult with injury or evidence of neglect):  -Violence can be a problem in many people's lives, so I now ask every patient about trauma or abuse they may have experienced in a relationship.   Stress/Safety - Do you feel safe in your relationship?   Afraid/Abused - Have you ever been in a relationship where you were threatened, hurt, or afraid?   Friend/Family - Are your friends aware you have been hurt?   Emergency Plan - Do you have a safe place to go and the resources you need in an emergency?    Review of Systems:    Review of Systems   Constitutional: Negative.    HENT: Negative.     Gastrointestinal:  Positive for abdominal pain and nausea.   Musculoskeletal:  Positive for back pain.   Neurological: Negative.    Hematological: Negative.        Vitals:    08/05/24 1528   BP: 122/78   Pulse: 58   SpO2: 100%   Weight: 72.3 kg (159 lb 6.4 oz)   Height: 160 cm (63\")     Body mass index is 28.24 kg/m².      Current Outpatient Medications:     escitalopram (LEXAPRO) 20 MG tablet, Take 1 tablet by mouth Daily., Disp: 90 tablet, Rfl: 2    baclofen (LIORESAL) 10 MG tablet, Take 1 tablet by mouth 3 (Three) Times a Day., Disp: 60 tablet, Rfl: 1    meloxicam (Mobic) 15 MG tablet, Take 1 tablet by mouth Daily., Disp: 30 tablet, Rfl: 5    [START ON 9/2/2024] varenicline (Chantix Continuing Month Varinder) 1 MG tablet, Take 1 tablet by mouth 2 (Two) Times a Day for 56 days., Disp: 56 tablet, Rfl: 1    Varenicline Tartrate, Starter, 0.5 MG X 11 & 1 MG X 42 tablet therapy pack, Take 0.5 mg by mouth Daily for 3 days, THEN 0.5 mg " "2 (Two) Times a Day for 4 days, THEN 1 mg 2 (Two) Times a Day for 21 days. Take 0.5 mg po daily x 3 days, then 0.5 mg po bid x 4 days, then 1 mg po bid, Disp: 1 each, Rfl: 0    Physical Exam:    Physical Exam  Vitals and nursing note reviewed.   Constitutional:       General: She is not in acute distress.     Appearance: She is well-developed. She is not diaphoretic.   HENT:      Head: Normocephalic and atraumatic.      Right Ear: External ear normal.      Left Ear: External ear normal.      Mouth/Throat:      Pharynx: No oropharyngeal exudate.   Eyes:      General: No scleral icterus.        Right eye: No discharge.      Conjunctiva/sclera: Conjunctivae normal.   Neck:      Thyroid: No thyromegaly.      Vascular: No JVD.      Trachea: No tracheal deviation.   Cardiovascular:      Rate and Rhythm: Normal rate and regular rhythm.      Heart sounds: Normal heart sounds.      Comments: PMI nondisplaced  Pulmonary:      Effort: Pulmonary effort is normal.      Breath sounds: Normal breath sounds. No wheezing or rales.   Abdominal:      General: Bowel sounds are normal.      Palpations: Abdomen is soft.      Tenderness: There is no abdominal tenderness. There is no guarding or rebound.      Comments: Is very tender in the right upper quadrant with inspiratory arrest the palpation.   Musculoskeletal:      Cervical back: Normal range of motion and neck supple.      Comments: Do not she had any \"lumbar mass\" he does have some lower lumbar tenderness over SI joint.  Range of motion appears full   Lymphadenopathy:      Cervical: No cervical adenopathy.   Skin:     General: Skin is warm and dry.      Capillary Refill: Capillary refill takes less than 2 seconds.      Coloration: Skin is not pale.      Findings: No rash.   Neurological:      Mental Status: She is alert and oriented to person, place, and time.      Motor: No abnormal muscle tone.      Coordination: Coordination normal.   Psychiatric:         Mood and Affect: Mood " normal.         Behavior: Behavior normal.         Judgment: Judgment normal.         Procedures    Results Review:  RIGHT UPPER QUADRANT ULTRASOUND    HISTORY: Right upper quadrant pain radiating to the back, nausea.    PROCEDURE: Ultrasound images of the right upper quadrant were obtained.    FINDINGS: The pancreas is obscured. The liver is homogeneous. There is  no focal abnormality identified. There is mild hepatomegaly measuring  16.5 cm. The hepatic veins are patent with normal directional flow. The  portal vein is patent with normal hepatopedal flow. The gallbladder is  contracted and contains numerous echogenic stones. There is wall  thickening at 3 mm. There is no pericholecystic fluid identified. The  common duct measures less than 2 mm mitigating against obstruction. The  right kidney measures 10.2 x 4.5 x 3.7 cm. There is no hydronephrosis.    Exam End: 12/13/22 17:35     None    Assessment/Plan:    Problem List Items Addressed This Visit       Anxiety    Relevant Medications    escitalopram (LEXAPRO) 20 MG tablet    Depression    Relevant Medications    escitalopram (LEXAPRO) 20 MG tablet    Varenicline Tartrate, Starter, 0.5 MG X 11 & 1 MG X 42 tablet therapy pack    varenicline (Chantix Continuing Month Varinder) 1 MG tablet (Start on 9/2/2024)    Right upper quadrant abdominal pain    Overweight (BMI 25.0-29.9)    Current Assessment & Plan     Patient's (Body mass index is 28.24 kg/m².) indicates that they are overweight with health conditions that include none . Weight is improving with lifestyle modifications. BMI is above average; BMI management plan is completed. We discussed low calorie, low carb based diet program, portion control, and increasing exercise.  Recommend nutritional counseling and Mediterranean diet. Per  min aerobic physical activity weekly            Other Visit Diagnoses       Annual physical exam    -  Primary    Relevant Orders    Comprehensive Metabolic Panel    Lipid Panel     CBC (No Diff)    Hepatitis C Antibody    TSH Rfx On Abnormal To Free T4    Encounter for routine history and physical exam in female        Relevant Orders    Ambulatory Referral to Gynecology    Cigarette nicotine dependence with nicotine-induced disorder        Relevant Medications    escitalopram (LEXAPRO) 20 MG tablet    Varenicline Tartrate, Starter, 0.5 MG X 11 & 1 MG X 42 tablet therapy pack    varenicline (Chantix Continuing Month Varinder) 1 MG tablet (Start on 9/2/2024)    Calculus of gallbladder with chronic cholecystitis without obstruction        Relevant Orders    US Gallbladder    Biliary colic        Relevant Orders    US Gallbladder    Ambulatory Referral to General Surgery (Completed)            Plan of care was reviewed with patient at the conclusion of today's visit. Counseled patient with regards to good nutrition and diet. Maintaining a healthy lifestyle including exercise and physical activities. Spoke with patient on ways to reduce stress, getting adequate sleep and injury prevention.  Discussed mammogram, colon cancer screening, osteoporosis and pap smear including benefit of early detection and potential need for follow-up. Patient agrees to screening mammogram, colonoscopy, bone density and gyn referral today. Annual dental and eye exams were encouraged. Encouraged patient to continue to follow up with annual immunizations.     Return in about 3 months (around 11/5/2024) for nicotine depend.    Andres Streeter MD    Please note than portions of this note were completed [employer]

## 2024-08-06 LAB
ALBUMIN SERPL-MCNC: 5.1 G/DL (ref 3.5–5.2)
ALBUMIN/GLOB SERPL: 1.6 G/DL
ALP SERPL-CCNC: 73 U/L (ref 39–117)
ALT SERPL W P-5'-P-CCNC: 10 U/L (ref 1–33)
ANION GAP SERPL CALCULATED.3IONS-SCNC: 13 MMOL/L (ref 5–15)
AST SERPL-CCNC: 14 U/L (ref 1–32)
BILIRUB SERPL-MCNC: 0.3 MG/DL (ref 0–1.2)
BUN SERPL-MCNC: 14 MG/DL (ref 6–20)
BUN/CREAT SERPL: 16.7 (ref 7–25)
CALCIUM SPEC-SCNC: 10.5 MG/DL (ref 8.6–10.5)
CHLORIDE SERPL-SCNC: 99 MMOL/L (ref 98–107)
CHOLEST SERPL-MCNC: 240 MG/DL (ref 0–200)
CO2 SERPL-SCNC: 26 MMOL/L (ref 22–29)
CREAT SERPL-MCNC: 0.84 MG/DL (ref 0.57–1)
DEPRECATED RDW RBC AUTO: 38.8 FL (ref 37–54)
EGFRCR SERPLBLD CKD-EPI 2021: 94.2 ML/MIN/1.73
ERYTHROCYTE [DISTWIDTH] IN BLOOD BY AUTOMATED COUNT: 12.5 % (ref 12.3–15.4)
GLOBULIN UR ELPH-MCNC: 3.1 GM/DL
GLUCOSE SERPL-MCNC: 84 MG/DL (ref 65–99)
HCT VFR BLD AUTO: 43 % (ref 34–46.6)
HCV AB SER QL: NORMAL
HDLC SERPL-MCNC: 50 MG/DL (ref 40–60)
HGB BLD-MCNC: 14.6 G/DL (ref 12–15.9)
LDLC SERPL CALC-MCNC: 121 MG/DL (ref 0–100)
LDLC/HDLC SERPL: 2.22 {RATIO}
MCH RBC QN AUTO: 29.2 PG (ref 26.6–33)
MCHC RBC AUTO-ENTMCNC: 34 G/DL (ref 31.5–35.7)
MCV RBC AUTO: 86 FL (ref 79–97)
PLATELET # BLD AUTO: 304 10*3/MM3 (ref 140–450)
PMV BLD AUTO: 10.1 FL (ref 6–12)
POTASSIUM SERPL-SCNC: 4.8 MMOL/L (ref 3.5–5.2)
PROT SERPL-MCNC: 8.2 G/DL (ref 6–8.5)
RBC # BLD AUTO: 5 10*6/MM3 (ref 3.77–5.28)
SODIUM SERPL-SCNC: 138 MMOL/L (ref 136–145)
TRIGL SERPL-MCNC: 395 MG/DL (ref 0–150)
TSH SERPL DL<=0.05 MIU/L-ACNC: 1.8 UIU/ML (ref 0.27–4.2)
VLDLC SERPL-MCNC: 69 MG/DL (ref 5–40)
WBC NRBC COR # BLD AUTO: 7.64 10*3/MM3 (ref 3.4–10.8)

## 2024-10-21 ENCOUNTER — APPOINTMENT (OUTPATIENT)
Facility: HOSPITAL | Age: 34
End: 2024-10-21
Payer: MEDICAID

## 2024-10-21 ENCOUNTER — HOSPITAL ENCOUNTER (EMERGENCY)
Facility: HOSPITAL | Age: 34
Discharge: HOME | End: 2024-10-21
Attending: EMERGENCY MEDICINE | Admitting: EMERGENCY MEDICINE
Payer: MEDICAID

## 2024-10-21 VITALS
HEART RATE: 56 BPM | WEIGHT: 160 LBS | HEIGHT: 63 IN | RESPIRATION RATE: 20 BRPM | SYSTOLIC BLOOD PRESSURE: 124 MMHG | TEMPERATURE: 97.9 F | BODY MASS INDEX: 28.35 KG/M2 | OXYGEN SATURATION: 95 % | DIASTOLIC BLOOD PRESSURE: 87 MMHG

## 2024-10-21 DIAGNOSIS — K81.1 CHRONIC CHOLECYSTITIS: Primary | ICD-10-CM

## 2024-10-21 LAB
ALBUMIN SERPL-MCNC: 4.2 G/DL (ref 3.5–5.2)
ALBUMIN/GLOB SERPL: 1.7 G/DL
ALP SERPL-CCNC: 61 U/L (ref 39–117)
ALT SERPL W P-5'-P-CCNC: <5 U/L (ref 1–33)
ANION GAP SERPL CALCULATED.3IONS-SCNC: 9.7 MMOL/L (ref 5–15)
AST SERPL-CCNC: 17 U/L (ref 1–32)
B-HCG UR QL: NEGATIVE
BASOPHILS # BLD AUTO: 0.03 10*3/MM3 (ref 0–0.2)
BASOPHILS NFR BLD AUTO: 0.4 % (ref 0–1.5)
BILIRUB SERPL-MCNC: 0.2 MG/DL (ref 0–1.2)
BUN SERPL-MCNC: 15 MG/DL (ref 6–20)
BUN/CREAT SERPL: 24.6 (ref 7–25)
CALCIUM SPEC-SCNC: 8.9 MG/DL (ref 8.6–10.5)
CHLORIDE SERPL-SCNC: 102 MMOL/L (ref 98–107)
CO2 SERPL-SCNC: 24.3 MMOL/L (ref 22–29)
CREAT SERPL-MCNC: 0.61 MG/DL (ref 0.57–1)
DEPRECATED RDW RBC AUTO: 39.4 FL (ref 37–54)
EGFRCR SERPLBLD CKD-EPI 2021: 121.2 ML/MIN/1.73
EOSINOPHIL # BLD AUTO: 0.16 10*3/MM3 (ref 0–0.4)
EOSINOPHIL NFR BLD AUTO: 2.3 % (ref 0.3–6.2)
ERYTHROCYTE [DISTWIDTH] IN BLOOD BY AUTOMATED COUNT: 12.4 % (ref 12.3–15.4)
EXPIRATION DATE: NORMAL
GLOBULIN UR ELPH-MCNC: 2.5 GM/DL
GLUCOSE SERPL-MCNC: 95 MG/DL (ref 65–99)
HCT VFR BLD AUTO: 37.2 % (ref 34–46.6)
HGB BLD-MCNC: 12.9 G/DL (ref 12–15.9)
HOLD SPECIMEN: NORMAL
IMM GRANULOCYTES # BLD AUTO: 0.02 10*3/MM3 (ref 0–0.05)
IMM GRANULOCYTES NFR BLD AUTO: 0.3 % (ref 0–0.5)
INTERNAL NEGATIVE CONTROL: NEGATIVE
INTERNAL POSITIVE CONTROL: POSITIVE
LIPASE SERPL-CCNC: 58 U/L (ref 13–60)
LYMPHOCYTES # BLD AUTO: 2.15 10*3/MM3 (ref 0.7–3.1)
LYMPHOCYTES NFR BLD AUTO: 30.3 % (ref 19.6–45.3)
Lab: NORMAL
MCH RBC QN AUTO: 29.7 PG (ref 26.6–33)
MCHC RBC AUTO-ENTMCNC: 34.7 G/DL (ref 31.5–35.7)
MCV RBC AUTO: 85.5 FL (ref 79–97)
MONOCYTES # BLD AUTO: 0.5 10*3/MM3 (ref 0.1–0.9)
MONOCYTES NFR BLD AUTO: 7.1 % (ref 5–12)
NEUTROPHILS NFR BLD AUTO: 4.23 10*3/MM3 (ref 1.7–7)
NEUTROPHILS NFR BLD AUTO: 59.6 % (ref 42.7–76)
PLATELET # BLD AUTO: 280 10*3/MM3 (ref 140–450)
PMV BLD AUTO: 9.6 FL (ref 6–12)
POTASSIUM SERPL-SCNC: 4.4 MMOL/L (ref 3.5–5.2)
PROT SERPL-MCNC: 6.7 G/DL (ref 6–8.5)
RBC # BLD AUTO: 4.35 10*6/MM3 (ref 3.77–5.28)
SODIUM SERPL-SCNC: 136 MMOL/L (ref 136–145)
WBC NRBC COR # BLD AUTO: 7.09 10*3/MM3 (ref 3.4–10.8)
WHOLE BLOOD HOLD COAG: NORMAL
WHOLE BLOOD HOLD SPECIMEN: NORMAL

## 2024-10-21 PROCEDURE — 99285 EMERGENCY DEPT VISIT HI MDM: CPT

## 2024-10-21 PROCEDURE — 85025 COMPLETE CBC W/AUTO DIFF WBC: CPT | Performed by: EMERGENCY MEDICINE

## 2024-10-21 PROCEDURE — 81025 URINE PREGNANCY TEST: CPT | Performed by: EMERGENCY MEDICINE

## 2024-10-21 PROCEDURE — 25010000002 ONDANSETRON PER 1 MG: Performed by: EMERGENCY MEDICINE

## 2024-10-21 PROCEDURE — 25510000001 IOPAMIDOL 61 % SOLUTION: Performed by: EMERGENCY MEDICINE

## 2024-10-21 PROCEDURE — 83690 ASSAY OF LIPASE: CPT | Performed by: EMERGENCY MEDICINE

## 2024-10-21 PROCEDURE — 80053 COMPREHEN METABOLIC PANEL: CPT | Performed by: EMERGENCY MEDICINE

## 2024-10-21 PROCEDURE — 74177 CT ABD & PELVIS W/CONTRAST: CPT

## 2024-10-21 PROCEDURE — 96374 THER/PROPH/DIAG INJ IV PUSH: CPT

## 2024-10-21 RX ORDER — IOPAMIDOL 612 MG/ML
85 INJECTION, SOLUTION INTRAVASCULAR
Status: COMPLETED | OUTPATIENT
Start: 2024-10-21 | End: 2024-10-21

## 2024-10-21 RX ORDER — AMOXICILLIN 250 MG
1 CAPSULE ORAL NIGHTLY
Status: SHIPPED | OUTPATIENT
Start: 2024-10-21 | End: 2024-10-24

## 2024-10-21 RX ORDER — ACETAMINOPHEN 500 MG
1000 TABLET ORAL ONCE
Status: COMPLETED | OUTPATIENT
Start: 2024-10-21 | End: 2024-10-21

## 2024-10-21 RX ORDER — ONDANSETRON 2 MG/ML
4 INJECTION INTRAMUSCULAR; INTRAVENOUS ONCE
Status: COMPLETED | OUTPATIENT
Start: 2024-10-21 | End: 2024-10-21

## 2024-10-21 RX ORDER — SODIUM CHLORIDE 0.9 % (FLUSH) 0.9 %
10 SYRINGE (ML) INJECTION AS NEEDED
Status: DISCONTINUED | OUTPATIENT
Start: 2024-10-21 | End: 2024-10-21 | Stop reason: HOSPADM

## 2024-10-21 RX ORDER — OXYCODONE HYDROCHLORIDE 5 MG/1
5 TABLET ORAL EVERY 4 HOURS PRN
Qty: 9 TABLET | Refills: 0 | Status: SHIPPED | OUTPATIENT
Start: 2024-10-21

## 2024-10-21 RX ORDER — ONDANSETRON 4 MG/1
4 TABLET, ORALLY DISINTEGRATING ORAL EVERY 8 HOURS PRN
Qty: 10 TABLET | Refills: 0 | Status: SHIPPED | OUTPATIENT
Start: 2024-10-21

## 2024-10-21 RX ADMIN — IOPAMIDOL 85 ML: 612 INJECTION, SOLUTION INTRAVENOUS at 02:46

## 2024-10-21 RX ADMIN — ONDANSETRON 4 MG: 2 INJECTION INTRAMUSCULAR; INTRAVENOUS at 02:33

## 2024-10-21 RX ADMIN — ACETAMINOPHEN 1000 MG: 500 TABLET ORAL at 02:32

## 2024-10-21 RX ADMIN — Medication 10 ML: at 03:32

## 2024-10-21 NOTE — FSED PROVIDER NOTE
"Subjective  History of Present Illness:    Patient with epigastric, right upper quadrant pain radiating to her back.  Has been present intermittently over the last several years.  She been told she has gallstones.  Was unable to follow-up with general surgeon as recommended previously.  She notes symptoms are worse when she eats.  Last bout of discomfort was stimulated by mashed potatoes with sour cream and butter and then.  She has not been consistent with modifying her diet as previously recommended.  No vomiting.  No fevers.    Nurses Notes reviewed and agree, including vitals, allergies, social history and prior medical history.     REVIEW OF SYSTEMS: All systems reviewed and not pertinent unless noted.    Past Medical History:   Diagnosis Date   • Anxiety    • Depression        Allergies:    Patient has no known allergies.      No past surgical history on file.      Social History     Socioeconomic History   • Marital status: Single   Tobacco Use   • Smoking status: Every Day     Current packs/day: 0.25     Average packs/day: 0.3 packs/day for 10.0 years (2.5 ttl pk-yrs)     Types: Cigarettes   • Smokeless tobacco: Never   Vaping Use   • Vaping status: Never Used   Substance and Sexual Activity   • Alcohol use: Yes   • Drug use: Never         Family History   Problem Relation Age of Onset   • Anxiety disorder Mother    • Cancer Father         bone   • Other Brother         schizophrenia       Objective  Physical Exam:  BP (!) 150/105 (BP Location: Left arm, Patient Position: Sitting)   Pulse 62   Temp 97.9 °F (36.6 °C) (Oral)   Resp 18   Ht 160 cm (63\")   Wt 72.6 kg (160 lb)   LMP 09/21/2024 (Approximate)   SpO2 100%   BMI 28.34 kg/m²      [Primary Survey    Airway: Patent and protected  Breathing: Symmetric bilaterally  Circulation: Mentating well, responsive        Constitutional: Nontoxic appearance.  Psychological: No abnormalities of mood affect.  Head: Atraumatic  Eyes: Conjunctiva are " non-injected. no scleral icterus.  ENT: No obvious congestion or obstruction noted  Neck: No obvious deformity.  ROM appears preserved  Chest: No deformity noted.  No paradoxical breathing noted  Respiratory: Respiratory effort was normal - no use of accessory respiratory muscles noted.  There is no stridor.  Cardiovascular: Perfusion appears preserved - mentating well RRR no murmurs  Gastrointestinal: Abdomen nondistended.  Soft, nontender  Genitourinary: Not examined  Lymphatic: Not examined  Back: Nontender  Musculoskeletal: Musculoskeletal system is grossly intact.  There is no obvious deformity.  Neurological: Face: No Asymmetry.  Gross motor movement is intact in all 4 extremities.  Walks and ambulates without difficulty.  Patient exhibits normal speech.  Skin: No Pallor no obvious bruising.  No obvious rash.]      ED Course:    Lab Results (last 24 hours)       ** No results found for the last 24 hours. **             No radiology results from the last 24 hrs     No orders to display       Procedures    MDM    34-year-old female with chronically intermittent upper quadrant abdominal pain.  ED Course as of 10/21/24 0324   Mon Oct 21, 2024   0250 POC Urine Pregnancy [MARTHA]   0250 CBC & Differential [MARTHA]   0303 On my review of the CT she has thickened gallbladder wall with some inflammation present.  She also has a gallstone in the neck of the gallbladder.  Given her symptom development and overall story it seems like this is likely a chronic problem.  I suspect this represents a chronic cholecystitis.  She has no significant leukocytosis.  LFTs are normal.  Lipase is within normal limits.  No evidence of pancreatitis.  I think she is appropriate for outpatient follow-up for chronic cholecystitis.  I think she will eventually need cholecystectomy but for certain she needs a consultation with general surgery.  Will refer to on-call surgeon.  Her symptoms improved here with p.o. Tylenol.  I will send her out with  oral pain medication should she have severe pain and a prescription for Zofran should she develop nausea we discussed the function of the gallbladder and a gallbladder.  Diet to ease her symptoms.  She was understanding.  I gave her return precautions which included returning for intractable pain/nausea vomiting or this abdominal pain with fever.  [MARTHA]      ED Course User Index  [MARTHA] Estuardo Willis MD        Medications   sodium chloride 0.9 % flush 10 mL (has no administration in time range)   acetaminophen (TYLENOL) tablet 1,000 mg (has no administration in time range)   ondansetron (ZOFRAN) injection 4 mg (has no administration in time range)       HEART SCORE   No data recorded           -----  ED Disposition       None          Final diagnoses:   None     Your Follow-Up Providers    Follow-up information has not been specified.       Contact information for after-discharge care    Follow-up information has not been specified.          Your medication list        ASK your doctor about these medications        Instructions Last Dose Given Next Dose Due   baclofen 10 MG tablet  Commonly known as: LIORESAL      Take 1 tablet by mouth 3 (Three) Times a Day.       escitalopram 20 MG tablet  Commonly known as: LEXAPRO      Take 1 tablet by mouth Daily.       meloxicam 15 MG tablet  Commonly known as: Mobic      Take 1 tablet by mouth Daily.       varenicline 1 MG tablet  Commonly known as: Chantix Continuing Month Varinder      Take 1 tablet by mouth 2 (Two) Times a Day for 56 days.

## 2024-12-16 ENCOUNTER — LAB (OUTPATIENT)
Facility: HOSPITAL | Age: 34
End: 2024-12-16
Payer: MEDICAID

## 2024-12-16 ENCOUNTER — TRANSCRIBE ORDERS (OUTPATIENT)
Facility: HOSPITAL | Age: 34
End: 2024-12-16
Payer: MEDICAID

## 2024-12-16 DIAGNOSIS — K80.80 BILIARY CALCULUS OF OTHER SITE WITHOUT OBSTRUCTION: Primary | ICD-10-CM

## 2024-12-16 DIAGNOSIS — K80.80 BILIARY CALCULUS OF OTHER SITE WITHOUT OBSTRUCTION: ICD-10-CM

## 2024-12-16 LAB
ALBUMIN SERPL-MCNC: 4.2 G/DL (ref 3.5–5.2)
ALBUMIN/GLOB SERPL: 1.6 G/DL
ALP SERPL-CCNC: 58 U/L (ref 39–117)
ALT SERPL W P-5'-P-CCNC: 16 U/L (ref 1–33)
ANION GAP SERPL CALCULATED.3IONS-SCNC: 9 MMOL/L (ref 5–15)
AST SERPL-CCNC: 17 U/L (ref 1–32)
BASOPHILS # BLD AUTO: 0.03 10*3/MM3 (ref 0–0.2)
BASOPHILS NFR BLD AUTO: 0.5 % (ref 0–1.5)
BILIRUB SERPL-MCNC: 0.4 MG/DL (ref 0–1.2)
BUN SERPL-MCNC: 11 MG/DL (ref 6–20)
BUN/CREAT SERPL: 13.8 (ref 7–25)
CALCIUM SPEC-SCNC: 9.3 MG/DL (ref 8.6–10.5)
CHLORIDE SERPL-SCNC: 103 MMOL/L (ref 98–107)
CO2 SERPL-SCNC: 24 MMOL/L (ref 22–29)
CREAT SERPL-MCNC: 0.8 MG/DL (ref 0.57–1)
DEPRECATED RDW RBC AUTO: 36.6 FL (ref 37–54)
EGFRCR SERPLBLD CKD-EPI 2021: 99.9 ML/MIN/1.73
EOSINOPHIL # BLD AUTO: 0.06 10*3/MM3 (ref 0–0.4)
EOSINOPHIL NFR BLD AUTO: 1 % (ref 0.3–6.2)
ERYTHROCYTE [DISTWIDTH] IN BLOOD BY AUTOMATED COUNT: 12.2 % (ref 12.3–15.4)
GLOBULIN UR ELPH-MCNC: 2.6 GM/DL
GLUCOSE SERPL-MCNC: 100 MG/DL (ref 65–99)
HCT VFR BLD AUTO: 37.6 % (ref 34–46.6)
HGB BLD-MCNC: 13.1 G/DL (ref 12–15.9)
IMM GRANULOCYTES # BLD AUTO: 0.01 10*3/MM3 (ref 0–0.05)
IMM GRANULOCYTES NFR BLD AUTO: 0.2 % (ref 0–0.5)
LYMPHOCYTES # BLD AUTO: 1.34 10*3/MM3 (ref 0.7–3.1)
LYMPHOCYTES NFR BLD AUTO: 23.3 % (ref 19.6–45.3)
MCH RBC QN AUTO: 29 PG (ref 26.6–33)
MCHC RBC AUTO-ENTMCNC: 34.8 G/DL (ref 31.5–35.7)
MCV RBC AUTO: 83.2 FL (ref 79–97)
MONOCYTES # BLD AUTO: 0.52 10*3/MM3 (ref 0.1–0.9)
MONOCYTES NFR BLD AUTO: 9.1 % (ref 5–12)
NEUTROPHILS NFR BLD AUTO: 3.78 10*3/MM3 (ref 1.7–7)
NEUTROPHILS NFR BLD AUTO: 65.9 % (ref 42.7–76)
PLATELET # BLD AUTO: 243 10*3/MM3 (ref 140–450)
PMV BLD AUTO: 9.7 FL (ref 6–12)
POTASSIUM SERPL-SCNC: 4.5 MMOL/L (ref 3.5–5.2)
PROT SERPL-MCNC: 6.8 G/DL (ref 6–8.5)
RBC # BLD AUTO: 4.52 10*6/MM3 (ref 3.77–5.28)
SODIUM SERPL-SCNC: 136 MMOL/L (ref 136–145)
WBC NRBC COR # BLD AUTO: 5.74 10*3/MM3 (ref 3.4–10.8)

## 2024-12-16 PROCEDURE — 80053 COMPREHEN METABOLIC PANEL: CPT

## 2024-12-16 PROCEDURE — 36415 COLL VENOUS BLD VENIPUNCTURE: CPT

## 2024-12-16 PROCEDURE — 85025 COMPLETE CBC W/AUTO DIFF WBC: CPT

## 2024-12-18 ENCOUNTER — LAB REQUISITION (OUTPATIENT)
Dept: LAB | Facility: HOSPITAL | Age: 34
End: 2024-12-18
Payer: MEDICAID

## 2024-12-18 DIAGNOSIS — K80.20 CALCULUS OF GALLBLADDER WITHOUT CHOLECYSTITIS WITHOUT OBSTRUCTION: ICD-10-CM

## 2024-12-18 PROCEDURE — 88304 TISSUE EXAM BY PATHOLOGIST: CPT | Performed by: SURGERY

## 2024-12-19 LAB
CYTO UR: NORMAL
LAB AP CASE REPORT: NORMAL
LAB AP CLINICAL INFORMATION: NORMAL
PATH REPORT.FINAL DX SPEC: NORMAL
PATH REPORT.GROSS SPEC: NORMAL

## 2025-01-31 ENCOUNTER — TELEPHONE (OUTPATIENT)
Dept: PEDIATRICS | Facility: OTHER | Age: 35
End: 2025-01-31
Payer: MEDICAID

## 2025-01-31 ENCOUNTER — TELEPHONE (OUTPATIENT)
Dept: FAMILY MEDICINE CLINIC | Facility: CLINIC | Age: 35
End: 2025-01-31

## 2025-01-31 DIAGNOSIS — Z00.00 ENCOUNTER FOR ROUTINE HISTORY AND PHYSICAL EXAM IN FEMALE: Primary | ICD-10-CM

## 2025-01-31 NOTE — TELEPHONE ENCOUNTER
Caller: Sonia Banks    Relationship: Self    Best call back number:      [phone] (Mobile)     Who are you requesting to speak with (clinical staff, provider,  specific staff member):CLINICAL      What was the call regarding: PATIENT IS REQUESTING TO SCHEDULE A PAP SMEAR   SHE SAID SHE HAS NOT HAD ONE DONE IN QUITE A WHILE     SCHEDULE SHOWS A PHYSICAL WITH PAP ON 8-5-24 WITH DR INFANTE     PLEASE CALL AND ADVISE

## 2025-01-31 NOTE — TELEPHONE ENCOUNTER
Caller: Sonia Banks    Relationship: Self    Best call back number: [phone] (Mobile)      What is the medical concern/diagnosis: LOWER BACK IS STIFF AND PAIN WHEN STANDING UP   PATIENT STATES SHE HAS NOT HAD A PAP SMEAR IN A WHILE     What specialty or service is being requested: GYN     What is the provider, practice or medical service name: [facility]     What is the office location: [place] LOCATION     Any additional details: PLEASE  CALL WITH REFERRAL

## 2025-02-14 ENCOUNTER — PATIENT ROUNDING (BHMG ONLY) (OUTPATIENT)
Dept: URGENT CARE | Facility: CLINIC | Age: 35
End: 2025-02-14
Payer: MEDICAID

## 2025-02-18 ENCOUNTER — OFFICE VISIT (OUTPATIENT)
Dept: FAMILY MEDICINE CLINIC | Facility: CLINIC | Age: 35
End: 2025-02-18
Payer: MEDICAID

## 2025-02-18 VITALS
OXYGEN SATURATION: 97 % | HEIGHT: 63 IN | DIASTOLIC BLOOD PRESSURE: 71 MMHG | BODY MASS INDEX: 28.46 KG/M2 | HEART RATE: 66 BPM | WEIGHT: 160.6 LBS | SYSTOLIC BLOOD PRESSURE: 132 MMHG

## 2025-02-18 DIAGNOSIS — Z12.4 PAPANICOLAOU SMEAR: Primary | ICD-10-CM

## 2025-02-18 PROBLEM — E66.3 OVERWEIGHT (BMI 25.0-29.9): Status: RESOLVED | Noted: 2024-08-05 | Resolved: 2025-02-18

## 2025-02-18 PROBLEM — R05.9 COUGH: Status: RESOLVED | Noted: 2021-06-25 | Resolved: 2025-02-18

## 2025-02-18 PROBLEM — R10.11 RIGHT UPPER QUADRANT ABDOMINAL PAIN: Status: RESOLVED | Noted: 2021-06-25 | Resolved: 2025-02-18

## 2025-02-18 NOTE — PROGRESS NOTES
Female Physical Note      Date: 2025   Patient Name: Sonia Banks  : 1990   MRN: [id number]     Chief Complaint:    Chief Complaint   Patient presents with    Gynecologic Exam     Pap smear        History of Present Illness: Sonia Banks is a 35 y.o. female who is here today for their Pap smear.  She is originally from [place].  She currently works as a  for an office facility.    Patient presents today for Pap smear.  She notes it has been over 5 years since her last screening.  She notes that in the past she has had an abnormal Pap smear that showed abnormal cells but did not have a follow-up.  She does note that she had her daughter 5 years ago.  She is a G1, P1 with a vaginal delivery.  She did suffer from gestational diabetes.  Patient denies any vaginal pain or discharge.  She notes that she will have irregular cycles with varying heaviness.  She does note that in December she skipped her cycle and then had a quite heavy cycle with clotting the following month.  Of note she did have her gallbladder removed at that time.  Patient did undergo a CAT scan of her abdomen at that time related to her gallbladder and had a screening for spine that was normal.  She is not interested in contraception at this time.  Did have conversation about contraceptive methods with minimal weight gain, she would like to think about it.    Patient notes she does have family history of cancer, and her dad had spinal cancer.          Subjective      Review of Systems:   Review of Systems   All other systems reviewed and are negative.      Past Medical History, Social History, Family History and Care Team were all reviewed with patient and updated as appropriate.     Medications:     Current Outpatient Medications:     baclofen (LIORESAL) 10 MG tablet, Take 1 tablet by mouth 3 (Three) Times a Day., Disp: 60 tablet, Rfl: 1    clindamycin (CLEOCIN) 300 MG capsule, Take 1 capsule by mouth 3 (Three) Times a  "Day for 7 days., Disp: 21 capsule, Rfl: 0    escitalopram (LEXAPRO) 20 MG tablet, Take 1 tablet by mouth Daily., Disp: 90 tablet, Rfl: 2    meloxicam (Mobic) 15 MG tablet, Take 1 tablet by mouth Daily., Disp: 30 tablet, Rfl: 5    ondansetron ODT (ZOFRAN-ODT) 4 MG disintegrating tablet, Take 1 tablet by mouth Every 8 (Eight) Hours As Needed for Nausea or Vomiting for up to 10 doses., Disp: 10 tablet, Rfl: 0    Allergies:   No Known Allergies    Immunizations:  Td/Tdap(Booster Q 10 yrs): Up-to-date  Flu (Yearly): Declined  Colorectal Screening:     Last Completed Colonoscopy       This patient has no relevant Health Maintenance data.           Pap:    Last Completed Pap Smear       This patient has no relevant Health Maintenance data.           Mammogram:    Last Completed Mammogram       This patient has no relevant Health Maintenance data.            PHQ-2 Depression Screening  Little interest or pleasure in doing things? Not at all   Feeling down, depressed, or hopeless? Not at all   PHQ-2 Total Score 0         Objective     Physical Exam:  Vital Signs:   Vitals:    02/18/25 1437   BP: 132/71   Pulse: 66   SpO2: 97%   Weight: 72.8 kg (160 lb 9.6 oz)   Height: 160 cm (62.99\")          Physical Exam  Vitals and nursing note reviewed. Exam conducted with a chaperone present.   Constitutional:       Appearance: Normal appearance. She is normal weight.   HENT:      Head: Normocephalic and atraumatic.      Nose: Nose normal.      Mouth/Throat:      Mouth: Mucous membranes are moist.   Eyes:      Extraocular Movements: Extraocular movements intact.      Pupils: Pupils are equal, round, and reactive to light.   Cardiovascular:      Rate and Rhythm: Normal rate.   Pulmonary:      Effort: Pulmonary effort is normal.   Abdominal:      General: Abdomen is flat.   Genitourinary:     Vagina: Normal.      Cervix: Normal.      Uterus: Normal.       Adnexa: Right adnexa normal and left adnexa normal.   Musculoskeletal:         " General: Normal range of motion.      Cervical back: Normal range of motion.   Skin:     General: Skin is warm.   Neurological:      General: No focal deficit present.      Mental Status: She is alert and oriented to person, place, and time.   Psychiatric:         Mood and Affect: Mood normal.         Behavior: Behavior normal.         Thought Content: Thought content normal.         Judgment: Judgment normal.         Procedures    Assessment / Plan      Assessment/Plan:   Diagnoses and all orders for this visit:    1. Papanicolaou smear (Primary)  Assessment & Plan:  Screening discussed.  Pap smear performed today.    Orders:  -     LIQUID-BASED PAP SMEAR WITH HPV GENOTYPING REGARDLESS OF INTERPRETATION (MYRA,COR,MAD); Future        Follow Up:   Return in about 1 year (around 2/18/2026) for Annual physical.    Healthcare Maintenance:   Counseling provided on immunizations, preventative screenings.   Sonia Banks voices understanding and acceptance of this advice and will call back with any further questions or concerns. AVS with preventive healthcare tips printed for patient.     Arabella Jackson DO   [facility] [place] [address]

## 2025-02-20 ENCOUNTER — PATIENT MESSAGE (OUTPATIENT)
Dept: FAMILY MEDICINE CLINIC | Facility: CLINIC | Age: 35
End: 2025-02-20
Payer: MEDICAID

## 2025-02-20 DIAGNOSIS — R87.620 ATYPICAL SQUAMOUS CELL CHANGES OF UNDETERMINED SIGNIFICANCE (ASCUS) ON VAGINAL CYTOLOGY: Primary | ICD-10-CM

## 2025-02-20 LAB — REF LAB TEST METHOD: NORMAL

## 2025-04-01 ENCOUNTER — TELEPHONE (OUTPATIENT)
Dept: OBSTETRICS AND GYNECOLOGY | Facility: CLINIC | Age: 35
End: 2025-04-01

## 2025-04-01 NOTE — TELEPHONE ENCOUNTER
Caller: Sonia Banks    Relationship:  Self    Best call back number: [phone]    PATIENT CALLED REQUESTING TO CANCEL SAME DAY APPT.    Did the patient call AFTER the start time of their scheduled appointment?  []YES  [x]NO    Was the patient's appointment rescheduled? []YES  [x]NO    Any additional information: CAR TROUBLE WILL CALL BACK TO R/S

## 2025-06-16 DIAGNOSIS — F41.9 ANXIETY: ICD-10-CM

## 2025-06-16 DIAGNOSIS — F33.42 RECURRENT MAJOR DEPRESSIVE DISORDER, IN FULL REMISSION: ICD-10-CM

## 2025-06-16 RX ORDER — ESCITALOPRAM OXALATE 20 MG/1
20 TABLET ORAL DAILY
Qty: 90 TABLET | Refills: 2 | Status: SHIPPED | OUTPATIENT
Start: 2025-06-16

## 2025-06-16 NOTE — TELEPHONE ENCOUNTER
Rx Refill Note  Requested Prescriptions     Pending Prescriptions Disp Refills    escitalopram (LEXAPRO) 20 MG tablet 90 tablet 2     Sig: Take 1 tablet by mouth Daily.      Last office visit with prescribing clinician: 8/5/2024   Last telemedicine visit with prescribing clinician: Visit date not found   Next office visit with prescribing clinician: Visit date not found   {    Chuyita Magana MA  06/16/25, 10:29 EDT

## 2025-06-20 DIAGNOSIS — F33.42 RECURRENT MAJOR DEPRESSIVE DISORDER, IN FULL REMISSION: ICD-10-CM

## 2025-06-20 DIAGNOSIS — F41.9 ANXIETY: ICD-10-CM

## 2025-06-23 ENCOUNTER — TELEPHONE (OUTPATIENT)
Dept: FAMILY MEDICINE CLINIC | Facility: CLINIC | Age: 35
End: 2025-06-23
Payer: MEDICAID

## 2025-06-23 NOTE — TELEPHONE ENCOUNTER
Patient Sonia (: 89) called stating she needs another refill on her prescription for escitalopram (Lexapro) 20 mg tablets. Dr. Andres Streeter had already sent in a script for this on  with 2 refills, but the pharmacy says it's too early for the refill.    Sonia mentioned she has been taking double the prescribed dose for the past couple of weeks because she has done that in the past. She has been too busy with her kids to come in for an appointment, as I advised her she would need to do for the prescription concern-refill.Sonia states she has been on every dose of this medication.     I called Zita, the clinical lead, to verify our procedure for this. She stated the patient must be seen by Dr. Andres Streeter to address this further due to running out of the medication quicker than prescribed. I am routing this message to Dr. Andres Streeter for him to decide what needs to be done regarding this.       The patient would like a call back regarding the next steps.

## 2025-06-24 RX ORDER — ESCITALOPRAM OXALATE 20 MG/1
20 TABLET ORAL DAILY
Qty: 90 TABLET | Refills: 2 | Status: SHIPPED | OUTPATIENT
Start: 2025-06-24

## 2025-07-23 ENCOUNTER — OFFICE VISIT (OUTPATIENT)
Dept: FAMILY MEDICINE CLINIC | Facility: CLINIC | Age: 35
End: 2025-07-23
Payer: MEDICAID

## 2025-07-23 VITALS
DIASTOLIC BLOOD PRESSURE: 78 MMHG | HEIGHT: 63 IN | BODY MASS INDEX: 29.41 KG/M2 | SYSTOLIC BLOOD PRESSURE: 122 MMHG | WEIGHT: 166 LBS | HEART RATE: 67 BPM | OXYGEN SATURATION: 99 %

## 2025-07-23 DIAGNOSIS — F33.42 RECURRENT MAJOR DEPRESSIVE DISORDER, IN FULL REMISSION: ICD-10-CM

## 2025-07-23 DIAGNOSIS — F41.9 ANXIETY: ICD-10-CM

## 2025-07-23 PROCEDURE — 99214 OFFICE O/P EST MOD 30 MIN: CPT | Performed by: INTERNAL MEDICINE

## 2025-07-23 PROCEDURE — 1125F AMNT PAIN NOTED PAIN PRSNT: CPT | Performed by: INTERNAL MEDICINE

## 2025-07-23 RX ORDER — BUPROPION HYDROCHLORIDE 150 MG/1
150 TABLET ORAL DAILY
Qty: 90 TABLET | Refills: 2 | Status: SHIPPED | OUTPATIENT
Start: 2025-07-23

## 2025-07-23 RX ORDER — ESCITALOPRAM OXALATE 20 MG/1
20 TABLET ORAL 2 TIMES DAILY
Qty: 90 TABLET | Refills: 2 | Status: SHIPPED | OUTPATIENT
Start: 2025-07-23

## 2025-07-23 NOTE — PROGRESS NOTES
"Sonia Banks  1990  [id number]  Patient Care Team:  Andres Streeter MD as PCP - General (Internal Medicine)    Sonia Banks is a 35 y.o. female here today for follow up.     This patient is accompanied by their self who contributes to the history of their care.    Chief Complaint:    Chief Complaint   Patient presents with    Anxiety     Up the dosage on lexapro        History of Present Illness:  I have reviewed and/or updated the patient's past medical, past surgical, family, social history, problem list and allergies as appropriate.     About  a month ago- she increased the lexapro to 40 mg. She was more irritable, overthinking and feeling down. No HI/SI. Sat that time she had more difficulty sleeping. At one time she was placed on abilify - this caused her solo.   She was on welbutin in the past which helped as well    Review of Systems   Constitutional:  Positive for fatigue.   Eyes: Negative.    Respiratory: Negative.     Cardiovascular: Negative.    Neurological: Negative.    Hematological: Negative.    Psychiatric/Behavioral: Negative.         Vitals:    07/23/25 1259   BP: 122/78   Pulse: 67   SpO2: 99%   Weight: 75.3 kg (166 lb)   Height: 160 cm (62.99\")     Body mass index is 29.41 kg/m².    Physical Exam  Vitals reviewed.   Constitutional:       Appearance: Normal appearance. She is well-developed.   HENT:      Head: Normocephalic and atraumatic.   Eyes:      Conjunctiva/sclera: Conjunctivae normal.   Neck:      Vascular: No JVD.   Cardiovascular:      Rate and Rhythm: Normal rate and regular rhythm.      Heart sounds: Normal heart sounds. No murmur heard.     No friction rub. No gallop.   Pulmonary:      Effort: Pulmonary effort is normal. No respiratory distress.      Breath sounds: Normal breath sounds. No wheezing or rales.   Chest:      Chest wall: No tenderness.   Abdominal:      Palpations: Abdomen is soft.      Tenderness: There is no abdominal tenderness.   Musculoskeletal:    "      General: Normal range of motion.   Skin:     General: Skin is warm and dry.   Neurological:      Mental Status: She is alert and oriented to person, place, and time.   Psychiatric:         Mood and Affect: Mood normal.         Behavior: Behavior normal.         Procedures    Results Review:    None    Assessment/Plan:    Problem List Items Addressed This Visit       Anxiety    Relevant Medications    escitalopram (LEXAPRO) 20 MG tablet    Depression    Relevant Medications    escitalopram (LEXAPRO) 20 MG tablet   (Above recommended maximal dose of Lexapro, she will decrease her current dose to 40 (which she self changed at home) begin Wellbutrin 150 mg XL daily.  She is in agreement with this.  Will open 4 weeks.    Plan of care reviewed with patient at the conclusion of today's visit. Education was provided regarding diagnosis and management.  Patient verbalizes understanding of and agreement with management plan.    Return in about 4 weeks (around 8/20/2025).    Andres Streeter MD      Please note than portions of this note were completed wt a Voice Recognition Program

## 2025-08-20 ENCOUNTER — OFFICE VISIT (OUTPATIENT)
Dept: FAMILY MEDICINE CLINIC | Facility: CLINIC | Age: 35
End: 2025-08-20
Payer: MEDICAID

## 2025-08-20 VITALS
HEART RATE: 73 BPM | WEIGHT: 167.6 LBS | DIASTOLIC BLOOD PRESSURE: 78 MMHG | SYSTOLIC BLOOD PRESSURE: 118 MMHG | HEIGHT: 63 IN | OXYGEN SATURATION: 99 % | BODY MASS INDEX: 29.7 KG/M2

## 2025-08-20 DIAGNOSIS — F41.9 ANXIETY: ICD-10-CM

## 2025-08-20 DIAGNOSIS — R29.898: Primary | ICD-10-CM

## 2025-08-20 PROCEDURE — 99214 OFFICE O/P EST MOD 30 MIN: CPT | Performed by: INTERNAL MEDICINE

## 2025-08-20 PROCEDURE — 1125F AMNT PAIN NOTED PAIN PRSNT: CPT | Performed by: INTERNAL MEDICINE

## 2025-08-20 RX ORDER — HYDROCODONE BITARTRATE AND ACETAMINOPHEN 5; 325 MG/1; MG/1
1 TABLET ORAL EVERY 8 HOURS PRN
Qty: 30 TABLET | Refills: 0 | Status: SHIPPED | OUTPATIENT
Start: 2025-08-20

## 2025-08-20 RX ORDER — CHLORHEXIDINE GLUCONATE ORAL RINSE 1.2 MG/ML
15 SOLUTION DENTAL 2 TIMES DAILY
Qty: 473 ML | Refills: 2 | Status: SHIPPED | OUTPATIENT
Start: 2025-08-20